# Patient Record
Sex: MALE | Race: BLACK OR AFRICAN AMERICAN | NOT HISPANIC OR LATINO | Employment: OTHER | URBAN - METROPOLITAN AREA
[De-identification: names, ages, dates, MRNs, and addresses within clinical notes are randomized per-mention and may not be internally consistent; named-entity substitution may affect disease eponyms.]

---

## 2017-06-14 ENCOUNTER — APPOINTMENT (OUTPATIENT)
Dept: PREADMISSION TESTING | Facility: HOSPITAL | Age: 67
End: 2017-06-14

## 2017-06-14 VITALS
DIASTOLIC BLOOD PRESSURE: 86 MMHG | RESPIRATION RATE: 18 BRPM | SYSTOLIC BLOOD PRESSURE: 135 MMHG | HEIGHT: 72 IN | WEIGHT: 187.19 LBS | HEART RATE: 85 BPM | OXYGEN SATURATION: 98 % | BODY MASS INDEX: 25.35 KG/M2 | TEMPERATURE: 98.2 F

## 2017-06-14 LAB
ANION GAP SERPL CALCULATED.3IONS-SCNC: 10.9 MMOL/L
BILIRUB UR QL STRIP: NEGATIVE
BUN BLD-MCNC: 15 MG/DL (ref 8–23)
BUN/CREAT SERPL: 12.5 (ref 7–25)
CALCIUM SPEC-SCNC: 9.2 MG/DL (ref 8.6–10.5)
CHLORIDE SERPL-SCNC: 100 MMOL/L (ref 98–107)
CLARITY UR: CLEAR
CO2 SERPL-SCNC: 30.1 MMOL/L (ref 22–29)
COLOR UR: YELLOW
CREAT BLD-MCNC: 1.2 MG/DL (ref 0.76–1.27)
DEPRECATED RDW RBC AUTO: 46.6 FL (ref 37–54)
ERYTHROCYTE [DISTWIDTH] IN BLOOD BY AUTOMATED COUNT: 14.7 % (ref 11.5–14.5)
GFR SERPL CREATININE-BSD FRML MDRD: 60 ML/MIN/1.73
GLUCOSE BLD-MCNC: 111 MG/DL (ref 65–99)
GLUCOSE UR STRIP-MCNC: NEGATIVE MG/DL
HCT VFR BLD AUTO: 42.7 % (ref 40.4–52.2)
HGB BLD-MCNC: 14.3 G/DL (ref 13.7–17.6)
HGB UR QL STRIP.AUTO: NEGATIVE
KETONES UR QL STRIP: NEGATIVE
LEUKOCYTE ESTERASE UR QL STRIP.AUTO: NEGATIVE
MCH RBC QN AUTO: 29.1 PG (ref 27–32.7)
MCHC RBC AUTO-ENTMCNC: 33.5 G/DL (ref 32.6–36.4)
MCV RBC AUTO: 86.8 FL (ref 79.8–96.2)
NITRITE UR QL STRIP: NEGATIVE
PH UR STRIP.AUTO: 7.5 [PH] (ref 5–8)
PLATELET # BLD AUTO: 267 10*3/MM3 (ref 140–500)
PMV BLD AUTO: 10.3 FL (ref 6–12)
POTASSIUM BLD-SCNC: 4.8 MMOL/L (ref 3.5–5.2)
PROT UR QL STRIP: ABNORMAL
RBC # BLD AUTO: 4.92 10*6/MM3 (ref 4.6–6)
SODIUM BLD-SCNC: 141 MMOL/L (ref 136–145)
SP GR UR STRIP: 1.02 (ref 1–1.03)
UROBILINOGEN UR QL STRIP: ABNORMAL
WBC NRBC COR # BLD: 4.92 10*3/MM3 (ref 4.5–10.7)

## 2017-06-14 PROCEDURE — 80048 BASIC METABOLIC PNL TOTAL CA: CPT | Performed by: UROLOGY

## 2017-06-14 PROCEDURE — 93005 ELECTROCARDIOGRAM TRACING: CPT

## 2017-06-14 PROCEDURE — 85027 COMPLETE CBC AUTOMATED: CPT | Performed by: UROLOGY

## 2017-06-14 PROCEDURE — 81003 URINALYSIS AUTO W/O SCOPE: CPT | Performed by: UROLOGY

## 2017-06-14 PROCEDURE — 36415 COLL VENOUS BLD VENIPUNCTURE: CPT

## 2017-06-14 PROCEDURE — 93010 ELECTROCARDIOGRAM REPORT: CPT | Performed by: INTERNAL MEDICINE

## 2017-06-14 RX ORDER — ASPIRIN 81 MG/1
81 TABLET ORAL DAILY
COMMUNITY

## 2017-06-14 NOTE — DISCHARGE INSTRUCTIONS
ARRIVE DAY OF SURGERY AT 10:30 AM        Take the following medications the morning of surgery with a small sip of water:    NONE    General Instructions:  • Do not eat solid food after midnight the night before surgery.  • You may drink clear liquids day of surgery but must stop at least one hour before your hospital arrival time.  • It is beneficial for you to have a clear drink that contains carbohydrates the day of surgery.  We suggest a 20 ounce bottle of Gatorade or Powerade for non-diabetic patients or a 20 ounce bottle of G2 or Powerade Zero for diabetic patients. (Pediatric patients, are not advised to drink a 20 ounce carbohydrate drink)    Clear liquids are liquids you can see through.  Nothing red in color.     Plain water                               Sports drinks  Sodas                                   Gelatin (Jell-O)  Fruit juices without pulp such as white grape juice and apple juice  Popsicles that contain no fruit or yogurt  Tea or coffee (no cream or milk added)  Gatorade / Powerade  G2 / Powerade Zero    • Infants may have breast milk up to four hours before surgery.  • Infants drinking formula may drink formula up to six hours before surgery.   • Patients who avoid smoking, chewing tobacco and alcohol for 4 weeks prior to surgery have a reduced risk of post-operative complications.  Quit smoking as many days before surgery as you can.  • Do not smoke, use chewing tobacco or drink alcohol the day of surgery.   • If applicable bring your C-PAP/ BI-PAP machine.  • Bring any papers given to you in the doctor’s office.  • Wear clean comfortable clothes and socks.  • Do not wear contact lenses or make-up.  Bring a case for your glasses.   • Bring crutches or walker if applicable.  • Leave all other valuables and jewelry at home.  • The Pre-Admission Testing nurse will instruct you to bring medications if unable to obtain an accurate list in Pre-Admission Testing.            Preventing a Surgical  Site Infection:  • For 2 to 3 days before surgery, avoid shaving with a razor because the razor can irritate skin and make it easier to develop an infection.  • The night prior to surgery sleep in a clean bed with clean clothing.  Do not allow pets to sleep with you.  • Shower on the morning of surgery using a fresh bar of anti-bacterial soap (such as Dial) and clean washcloth.  Dry with a clean towel and dress in clean clothing.  • Ask your surgeon if you will be receiving antibiotics prior to surgery.  • Make sure you, your family, and all healthcare providers clean their hands with soap and water or an alcohol based hand  before caring for you or your wound.    Day of surgery:  Upon arrival, a Pre-op nurse and Anesthesiologist will review your health history, obtain vital signs, and answer questions you may have.  The only belongings needed at this time will be your home medications and if applicable your C-PAP/BI-PAP machine.  If you are staying overnight your family can leave the rest of your belongings in the car and bring them to your room later.  A Pre-op nurse will start an IV and you may receive medication in preparation for surgery, including something to help you relax.  Your family will be able to see you in the Pre-op area.  While you are in surgery your family should notify the waiting room  if they leave the waiting room area and provide a contact phone number.    Please be aware that surgery does come with discomfort.  We want to make every effort to control your discomfort so please discuss any uncontrolled symptoms with your nurse.   Your doctor will most likely have prescribed pain medications.      If you are going home after surgery you will receive individualized written care instructions before being discharged.  A responsible adult must drive you to and from the hospital on the day of your surgery and stay with you for 24 hours.    If you are staying overnight following  surgery, you will be transported to your hospital room following the recovery period.  [facility] has all private rooms.    If you have any questions please call Pre-Admission Testing at [phone].  Deductibles and co-payments are collected on the day of service. Please be prepared to pay the required co-pay, deductible or deposit on the day of service as defined by your plan.

## 2017-06-28 ENCOUNTER — ANESTHESIA (OUTPATIENT)
Dept: PERIOP | Facility: HOSPITAL | Age: 67
End: 2017-06-28

## 2017-06-28 ENCOUNTER — ANESTHESIA EVENT (OUTPATIENT)
Dept: PERIOP | Facility: HOSPITAL | Age: 67
End: 2017-06-28

## 2017-06-28 ENCOUNTER — HOSPITAL ENCOUNTER (OUTPATIENT)
Facility: HOSPITAL | Age: 67
Setting detail: HOSPITAL OUTPATIENT SURGERY
Discharge: HOME | End: 2017-06-28
Attending: UROLOGY | Admitting: UROLOGY

## 2017-06-28 VITALS
TEMPERATURE: 97.7 F | DIASTOLIC BLOOD PRESSURE: 83 MMHG | RESPIRATION RATE: 18 BRPM | HEIGHT: 72 IN | SYSTOLIC BLOOD PRESSURE: 126 MMHG | WEIGHT: 189.6 LBS | BODY MASS INDEX: 25.68 KG/M2 | HEART RATE: 51 BPM | OXYGEN SATURATION: 100 %

## 2017-06-28 DIAGNOSIS — N39.3 STRESS INCONTINENCE, MALE: Primary | ICD-10-CM

## 2017-06-28 DIAGNOSIS — Z85.46 HISTORY OF PROSTATE CANCER: Chronic | ICD-10-CM

## 2017-06-28 PROCEDURE — 25010000002 ONDANSETRON PER 1 MG: Performed by: NURSE ANESTHETIST, CERTIFIED REGISTERED

## 2017-06-28 PROCEDURE — C1771 REP DEV, URINARY, W/SLING: HCPCS | Performed by: UROLOGY

## 2017-06-28 PROCEDURE — 25010000002 MIDAZOLAM PER 1 MG: Performed by: ANESTHESIOLOGY

## 2017-06-28 PROCEDURE — 25010000002 PROPOFOL 10 MG/ML EMULSION: Performed by: NURSE ANESTHETIST, CERTIFIED REGISTERED

## 2017-06-28 PROCEDURE — 25010000002 KETOROLAC TROMETHAMINE PER 15 MG: Performed by: NURSE ANESTHETIST, CERTIFIED REGISTERED

## 2017-06-28 PROCEDURE — 25010000002 FENTANYL CITRATE (PF) 100 MCG/2ML SOLUTION: Performed by: ANESTHESIOLOGY

## 2017-06-28 PROCEDURE — 25010000002 LEVOFLOXACIN PER 250 MG: Performed by: UROLOGY

## 2017-06-28 PROCEDURE — 25010000002 FENTANYL CITRATE (PF) 100 MCG/2ML SOLUTION: Performed by: NURSE ANESTHETIST, CERTIFIED REGISTERED

## 2017-06-28 DEVICE — SLNG M SYS ADV: Type: IMPLANTABLE DEVICE | Status: FUNCTIONAL

## 2017-06-28 RX ORDER — MIDAZOLAM HYDROCHLORIDE 1 MG/ML
1 INJECTION INTRAMUSCULAR; INTRAVENOUS
Status: DISCONTINUED | OUTPATIENT
Start: 2017-06-28 | End: 2017-06-28 | Stop reason: HOSPADM

## 2017-06-28 RX ORDER — DOCUSATE SODIUM 100 MG/1
100 CAPSULE, LIQUID FILLED ORAL 2 TIMES DAILY
Qty: 60 CAPSULE | Refills: 1 | Status: SHIPPED | OUTPATIENT
Start: 2017-06-28 | End: 2018-06-28

## 2017-06-28 RX ORDER — HYDRALAZINE HYDROCHLORIDE 20 MG/ML
5 INJECTION INTRAMUSCULAR; INTRAVENOUS
Status: DISCONTINUED | OUTPATIENT
Start: 2017-06-28 | End: 2017-06-28 | Stop reason: HOSPADM

## 2017-06-28 RX ORDER — OXYCODONE AND ACETAMINOPHEN 7.5; 325 MG/1; MG/1
TABLET ORAL
Status: COMPLETED
Start: 2017-06-28 | End: 2017-06-28

## 2017-06-28 RX ORDER — FENTANYL CITRATE 50 UG/ML
25 INJECTION, SOLUTION INTRAMUSCULAR; INTRAVENOUS
Status: DISCONTINUED | OUTPATIENT
Start: 2017-06-28 | End: 2017-06-28 | Stop reason: HOSPADM

## 2017-06-28 RX ORDER — DIPHENHYDRAMINE HYDROCHLORIDE 50 MG/ML
12.5 INJECTION INTRAMUSCULAR; INTRAVENOUS
Status: DISCONTINUED | OUTPATIENT
Start: 2017-06-28 | End: 2017-06-28 | Stop reason: HOSPADM

## 2017-06-28 RX ORDER — OXYCODONE AND ACETAMINOPHEN 7.5; 325 MG/1; MG/1
1 TABLET ORAL EVERY 4 HOURS PRN
Status: DISCONTINUED | OUTPATIENT
Start: 2017-06-28 | End: 2017-06-28 | Stop reason: HOSPADM

## 2017-06-28 RX ORDER — FENTANYL CITRATE 50 UG/ML
50 INJECTION, SOLUTION INTRAMUSCULAR; INTRAVENOUS
Status: DISCONTINUED | OUTPATIENT
Start: 2017-06-28 | End: 2017-06-28 | Stop reason: HOSPADM

## 2017-06-28 RX ORDER — SODIUM CHLORIDE, SODIUM LACTATE, POTASSIUM CHLORIDE, CALCIUM CHLORIDE 600; 310; 30; 20 MG/100ML; MG/100ML; MG/100ML; MG/100ML
9 INJECTION, SOLUTION INTRAVENOUS CONTINUOUS
Status: DISCONTINUED | OUTPATIENT
Start: 2017-06-28 | End: 2017-06-28 | Stop reason: HOSPADM

## 2017-06-28 RX ORDER — SODIUM CHLORIDE 0.9 % (FLUSH) 0.9 %
1-10 SYRINGE (ML) INJECTION AS NEEDED
Status: DISCONTINUED | OUTPATIENT
Start: 2017-06-28 | End: 2017-06-28 | Stop reason: HOSPADM

## 2017-06-28 RX ORDER — LABETALOL HYDROCHLORIDE 5 MG/ML
5 INJECTION, SOLUTION INTRAVENOUS
Status: DISCONTINUED | OUTPATIENT
Start: 2017-06-28 | End: 2017-06-28 | Stop reason: HOSPADM

## 2017-06-28 RX ORDER — MAGNESIUM HYDROXIDE 1200 MG/15ML
LIQUID ORAL AS NEEDED
Status: DISCONTINUED | OUTPATIENT
Start: 2017-06-28 | End: 2017-06-28 | Stop reason: HOSPADM

## 2017-06-28 RX ORDER — LIDOCAINE HYDROCHLORIDE 20 MG/ML
INJECTION, SOLUTION INFILTRATION; PERINEURAL AS NEEDED
Status: DISCONTINUED | OUTPATIENT
Start: 2017-06-28 | End: 2017-06-28 | Stop reason: SURG

## 2017-06-28 RX ORDER — BUPIVACAINE HYDROCHLORIDE 2.5 MG/ML
INJECTION, SOLUTION INFILTRATION; PERINEURAL AS NEEDED
Status: DISCONTINUED | OUTPATIENT
Start: 2017-06-28 | End: 2017-06-28 | Stop reason: HOSPADM

## 2017-06-28 RX ORDER — PROPOFOL 10 MG/ML
VIAL (ML) INTRAVENOUS AS NEEDED
Status: DISCONTINUED | OUTPATIENT
Start: 2017-06-28 | End: 2017-06-28 | Stop reason: SURG

## 2017-06-28 RX ORDER — SULFAMETHOXAZOLE AND TRIMETHOPRIM 800; 160 MG/1; MG/1
1 TABLET ORAL 2 TIMES DAILY
Qty: 20 TABLET | Refills: 0 | Status: SHIPPED | OUTPATIENT
Start: 2017-06-28 | End: 2017-07-08

## 2017-06-28 RX ORDER — FAMOTIDINE 10 MG/ML
20 INJECTION, SOLUTION INTRAVENOUS ONCE
Status: COMPLETED | OUTPATIENT
Start: 2017-06-28 | End: 2017-06-28

## 2017-06-28 RX ORDER — ONDANSETRON 2 MG/ML
INJECTION INTRAMUSCULAR; INTRAVENOUS AS NEEDED
Status: DISCONTINUED | OUTPATIENT
Start: 2017-06-28 | End: 2017-06-28 | Stop reason: SURG

## 2017-06-28 RX ORDER — ONDANSETRON 2 MG/ML
4 INJECTION INTRAMUSCULAR; INTRAVENOUS ONCE AS NEEDED
Status: DISCONTINUED | OUTPATIENT
Start: 2017-06-28 | End: 2017-06-28 | Stop reason: HOSPADM

## 2017-06-28 RX ORDER — KETOROLAC TROMETHAMINE 30 MG/ML
INJECTION, SOLUTION INTRAMUSCULAR; INTRAVENOUS AS NEEDED
Status: DISCONTINUED | OUTPATIENT
Start: 2017-06-28 | End: 2017-06-28 | Stop reason: SURG

## 2017-06-28 RX ORDER — LEVOFLOXACIN 5 MG/ML
500 INJECTION, SOLUTION INTRAVENOUS ONCE
Status: COMPLETED | OUTPATIENT
Start: 2017-06-28 | End: 2017-06-28

## 2017-06-28 RX ORDER — MIDAZOLAM HYDROCHLORIDE 1 MG/ML
2 INJECTION INTRAMUSCULAR; INTRAVENOUS
Status: DISCONTINUED | OUTPATIENT
Start: 2017-06-28 | End: 2017-06-28 | Stop reason: HOSPADM

## 2017-06-28 RX ORDER — OXYCODONE AND ACETAMINOPHEN 7.5; 325 MG/1; MG/1
1 TABLET ORAL EVERY 4 HOURS PRN
Qty: 40 TABLET | Refills: 0 | Status: SHIPPED | OUTPATIENT
Start: 2017-06-28 | End: 2018-06-28

## 2017-06-28 RX ORDER — ALBUTEROL SULFATE 2.5 MG/3ML
2.5 SOLUTION RESPIRATORY (INHALATION) ONCE AS NEEDED
Status: DISCONTINUED | OUTPATIENT
Start: 2017-06-28 | End: 2017-06-28 | Stop reason: HOSPADM

## 2017-06-28 RX ORDER — HYDROMORPHONE HYDROCHLORIDE 1 MG/ML
0.5 INJECTION, SOLUTION INTRAMUSCULAR; INTRAVENOUS; SUBCUTANEOUS
Status: DISCONTINUED | OUTPATIENT
Start: 2017-06-28 | End: 2017-06-28 | Stop reason: HOSPADM

## 2017-06-28 RX ADMIN — FENTANYL CITRATE 25 MCG: 50 INJECTION INTRAMUSCULAR; INTRAVENOUS at 14:04

## 2017-06-28 RX ADMIN — LEVOFLOXACIN 500 MG: 5 INJECTION, SOLUTION INTRAVENOUS at 13:18

## 2017-06-28 RX ADMIN — OXYCODONE HYDROCHLORIDE AND ACETAMINOPHEN 1 TABLET: 7.5; 325 TABLET ORAL at 16:25

## 2017-06-28 RX ADMIN — SODIUM CHLORIDE, POTASSIUM CHLORIDE, SODIUM LACTATE AND CALCIUM CHLORIDE 9 ML/HR: 600; 310; 30; 20 INJECTION, SOLUTION INTRAVENOUS at 15:53

## 2017-06-28 RX ADMIN — FENTANYL CITRATE 25 MCG: 50 INJECTION INTRAMUSCULAR; INTRAVENOUS at 15:45

## 2017-06-28 RX ADMIN — FENTANYL CITRATE 25 MCG: 50 INJECTION INTRAMUSCULAR; INTRAVENOUS at 14:32

## 2017-06-28 RX ADMIN — OXYCODONE AND ACETAMINOPHEN 1 TABLET: 7.5; 325 TABLET ORAL at 16:25

## 2017-06-28 RX ADMIN — FENTANYL CITRATE 25 MCG: 50 INJECTION INTRAMUSCULAR; INTRAVENOUS at 15:30

## 2017-06-28 RX ADMIN — PROPOFOL 250 MG: 10 INJECTION, EMULSION INTRAVENOUS at 13:38

## 2017-06-28 RX ADMIN — KETOROLAC TROMETHAMINE 30 MG: 30 INJECTION, SOLUTION INTRAMUSCULAR; INTRAVENOUS at 13:46

## 2017-06-28 RX ADMIN — SODIUM CHLORIDE, POTASSIUM CHLORIDE, SODIUM LACTATE AND CALCIUM CHLORIDE 9 ML/HR: 600; 310; 30; 20 INJECTION, SOLUTION INTRAVENOUS at 13:25

## 2017-06-28 RX ADMIN — FENTANYL CITRATE 25 MCG: 50 INJECTION INTRAMUSCULAR; INTRAVENOUS at 13:44

## 2017-06-28 RX ADMIN — FENTANYL CITRATE 25 MCG: 50 INJECTION INTRAMUSCULAR; INTRAVENOUS at 15:55

## 2017-06-28 RX ADMIN — MIDAZOLAM 2 MG: 1 INJECTION INTRAMUSCULAR; INTRAVENOUS at 13:23

## 2017-06-28 RX ADMIN — FAMOTIDINE 20 MG: 10 INJECTION INTRAVENOUS at 13:23

## 2017-06-28 RX ADMIN — LIDOCAINE HYDROCHLORIDE 40 MG: 20 INJECTION, SOLUTION INFILTRATION; PERINEURAL at 13:38

## 2017-06-28 RX ADMIN — FENTANYL CITRATE 25 MCG: 50 INJECTION INTRAMUSCULAR; INTRAVENOUS at 15:21

## 2017-06-28 RX ADMIN — ONDANSETRON 4 MG: 2 INJECTION INTRAMUSCULAR; INTRAVENOUS at 13:43

## 2017-06-28 NOTE — ANESTHESIA POSTPROCEDURE EVALUATION
Patient: Ion Matthew    Procedure Summary     Date Anesthesia Start Anesthesia Stop Room / Location    06/28/17 7039 [id number]  [facility] / [facility]       Procedure Diagnosis Surgeon Provider    MALE ADVANCE SLING, CYSTOSCOPY (N/A ) Stress incontinence, male MD Leo Mohan MD          Anesthesia Type: general  Last vitals  /81 (06/28/17 1658)    Temp 36.5 °C (97.7 °F) (06/28/17 1630)    Pulse 50 (06/28/17 1658)   Resp 18 (06/28/17 1658)    SpO2 100 % (06/28/17 1658)      Post Anesthesia Care and Evaluation    Patient location during evaluation: PACU  Patient participation: complete - patient participated  Level of consciousness: awake and alert  Pain management: adequate  Airway patency: patent  Anesthetic complications: No anesthetic complications    Cardiovascular status: acceptable  Respiratory status: acceptable  Hydration status: acceptable

## 2017-06-28 NOTE — ANESTHESIA PREPROCEDURE EVALUATION
Anesthesia Evaluation     Patient summary reviewed and Nursing notes reviewed   NPO Solid Status: > 8 hours       Airway   Mallampati: II  TM distance: >3 FB  Neck ROM: full  Dental - normal exam     Pulmonary - negative pulmonary ROS and normal exam    breath sounds clear to auscultation  Cardiovascular - normal exam    ECG reviewed  Rhythm: regular  Rate: normal    (+) hypertension,   (-) angina, orthopnea, PND, MOTT      Neuro/Psych- negative ROS  GI/Hepatic/Renal/Endo - negative ROS     Musculoskeletal (-) negative ROS    Abdominal    Substance History - negative use     OB/GYN negative ob/gyn ROS         Other      history of cancer (History of prostate cancer)                                    Anesthesia Plan    ASA 2     general     intravenous induction   Anesthetic plan and risks discussed with patient.

## 2017-06-28 NOTE — ANESTHESIA PROCEDURE NOTES
Airway  Urgency: elective    Airway not difficult    General Information and Staff    Patient location during procedure: OR  Anesthesiologist: NEVAEH CARMONA  CRNA: CHANO DE LA CRUZ    Indications and Patient Condition  Indications for airway management: airway protection    Preoxygenated: yes  Mask difficulty assessment: 1 - vent by mask    Final Airway Details  Final airway type: supraglottic airway      Successful airway: unique  Size 5    Number of attempts at approach: 1    Additional Comments  Smooth IV/mask induction and placement of LMA. Atraumatic, lips/teeth/mouth intact, as preop. +ETCO2, bilateral breath sounds and equal.

## 2017-07-12 NOTE — OP NOTE
URETHRAL SLING  Procedure Note    Ion Matthew  6/28/2017    Pre-op Diagnosis:   * No pre-op diagnosis entered *    Post-op Diagnosis:     Post-Op Diagnosis Codes:     * Stress incontinence, male [N39.3]    Procedure(s):  MALE ADVANCE SLING, CYSTOSCOPY    Surgeon(s):  Curt Hickman MD    Anesthesia: General    Staff:   Circulator: Lala Pederson RN; Shannon Spurling, RN  Scrub Person: Millie Peralta; Dameon Barney  Assistant: Emi Jay RN    Estimated Blood Loss: *No blood loss documented*    Specimens:                * No orders in the log *      Drains:   Urethral Catheter 06/28/17 1354 latex 16 (Active)   Daily Indications Selected surgeries ( tract, abdomen) 6/28/2017  4:43 PM   Securement secured to upper leg with adhesive device 6/28/2017  4:43 PM   Tolerance no signs/symptoms of discomfort 6/28/2017  4:43 PM   Urine Output (mL) 100 6/28/2017  5:20 PM           Findings: Stress incontinence    Complications: None    Indications: 67-year-old gentleman history of prostate cancer post-prostatectomy.  He has persistent incontinence.  His incontinence is mildly bothersome.  He'll undergo male urethral sling    Procedure: Patient was taken out for suite given general endotracheal anesthesia after informed consent was obtained.  He was placed in a lithotomy position.  His knees were leveled with the femurs at 90° and the knees about shoulder width apart.  He was prepped and draped in sterile fashion.  A 14 Dutch Pisano catheter was placed.  The midline perineal incision was made and we cut down onto the bulbar urethra.  The bulbospongiosus muscle was incised in the midline.  North Star retractor was utilized.  The central tendon was taken down in the bulbar urethra was marked at its insertion.  I made 2 stab incisions in the groin just below the abductor longus tendon on each side.  Our AMS sling was soaked in bacitracin solution and the tension sutures were tacked down with 2-0 Vicryl  using the  passers we then went from the groin incision underneath the pubic symphysis through the  canal [place] coming up underneath the symphysis at the angle of the urethra and the arch of the pubic bone.  One arm of the sling was pulled through on the left and in a similar fashion the right side was pulled through.  The most proximal portion of the butterfly sling was then tacked down to the bulbar urethra at the level of our central tendon and then it was stretched over the bulbar urethra tacked down with another Vicryl stitch.  I removed the catheter and cystoscopy was performed.  I pulled back through the sphincteric urethra pulled apart sling excellent coaptation was shown as we elevated the sphincteric complex.  I dropped down the knees slightly and pulled up the sling further.  The  passers were cut off this arms of the sling were pulled out and the sling was tension one more time.  I irrigated all the incisions bacitracin solution and then I closed the Cresencio's muscle with 2-0 Vicryl and the perineum was closed in multiple layers of 2-0 Vicryl and 4-0 Vicryl subcuticular.  14 Greenlandic Pisano catheter was placed.  Arms of the sling were trimmed off and the 2 groin incisions was closed with 4-0 Vicryl.  Skin glue was placed all the wounds.  He was woken  Taken to recovery in stable condition.      Curt Hickman MD     Date: 7/12/2017  Time: 11:40 AM

## 2018-11-16 ENCOUNTER — CONVERSION ENCOUNTER (OUTPATIENT)
Dept: GENERAL RADIOLOGY | Facility: HOSPITAL | Age: 68
End: 2018-11-16

## 2019-05-17 ENCOUNTER — HOSPITAL ENCOUNTER (OUTPATIENT)
Dept: GENERAL RADIOLOGY | Facility: HOSPITAL | Age: 69
Discharge: HOME | End: 2019-05-17
Attending: FAMILY MEDICINE

## 2019-05-31 ENCOUNTER — HOSPITAL ENCOUNTER (OUTPATIENT)
Dept: GENERAL RADIOLOGY | Facility: HOSPITAL | Age: 69
Discharge: HOME | End: 2019-05-31
Attending: FAMILY MEDICINE

## 2019-06-21 ENCOUNTER — HOSPITAL ENCOUNTER (OUTPATIENT)
Dept: GENERAL RADIOLOGY | Facility: HOSPITAL | Age: 69
Discharge: HOME | End: 2019-06-21
Attending: FAMILY MEDICINE

## 2020-05-08 ENCOUNTER — HOSPITAL ENCOUNTER (OUTPATIENT)
Dept: GENERAL RADIOLOGY | Facility: HOSPITAL | Age: 70
Discharge: HOME | End: 2020-05-08
Attending: FAMILY MEDICINE

## 2021-01-22 ENCOUNTER — HOSPITAL ENCOUNTER (OUTPATIENT)
Dept: NUCLEAR MEDICINE | Facility: HOSPITAL | Age: 71
Discharge: HOME | End: 2021-01-22
Attending: FAMILY MEDICINE

## 2021-05-21 ENCOUNTER — HOSPITAL ENCOUNTER (OUTPATIENT)
Dept: URGENT CARE | Facility: CLINIC | Age: 71
Discharge: HOME | End: 2021-05-21
Attending: EMERGENCY MEDICINE

## 2021-09-01 ENCOUNTER — OFFICE VISIT (OUTPATIENT)
Dept: CARDIOLOGY | Facility: CLINIC | Age: 71
End: 2021-09-01

## 2021-09-01 VITALS
HEART RATE: 54 BPM | DIASTOLIC BLOOD PRESSURE: 72 MMHG | SYSTOLIC BLOOD PRESSURE: 137 MMHG | WEIGHT: 198 LBS | HEIGHT: 72 IN | BODY MASS INDEX: 26.82 KG/M2

## 2021-09-01 DIAGNOSIS — I10 ESSENTIAL HYPERTENSION: ICD-10-CM

## 2021-09-01 DIAGNOSIS — R94.31 ABNORMAL EKG: ICD-10-CM

## 2021-09-01 DIAGNOSIS — R07.2 PRECORDIAL PAIN: Primary | ICD-10-CM

## 2021-09-01 PROCEDURE — 93000 ELECTROCARDIOGRAM COMPLETE: CPT | Performed by: INTERNAL MEDICINE

## 2021-09-01 PROCEDURE — 99204 OFFICE O/P NEW MOD 45 MIN: CPT | Performed by: INTERNAL MEDICINE

## 2021-09-01 RX ORDER — DICLOFENAC SODIUM 75 MG/1
75 TABLET, DELAYED RELEASE ORAL 2 TIMES DAILY
COMMUNITY
Start: 2021-07-23 | End: 2022-06-09

## 2021-09-01 RX ORDER — ATENOLOL AND CHLORTHALIDONE TABLET 50; 25 MG/1; MG/1
1 TABLET ORAL DAILY
COMMUNITY
Start: 2021-06-09

## 2021-09-01 NOTE — PROGRESS NOTES
Chief Complaint  Chest Pain    Subjective            Ion Matthew presents to [facility] CARDIOLOGY  History of Present Illness    Mr. Matthew is a 71-year-old male he has no previous cardiac history.  He is treated for hypertension.  He has had intermittent chest pain over the years and has had a couple of stress test procedures in the past but nothing since 2005.  He has focal left-sided mid pectoral chest pain, mild to moderately intense intermittent sometimes lasting 20 to 30 minutes at a time.  It is not specifically exertional.  No associated shortness of breath or dizziness.    PMH  Past Medical History:   Diagnosis Date   • Bladder leak    • Cancer (CMS/[facility]) 2006    Prostate   • History of prostate cancer    • Pinched nerve in neck    • Wrist pain          SURGICALHX  Past Surgical History:   Procedure Laterality Date   • APPENDECTOMY     • HERNIA REPAIR     • PROSTATECTOMY     • URETHRAL SLING N/A 6/28/2017    Procedure: MALE ADVANCE SLING, CYSTOSCOPY;  Surgeon: Curt Hickman MD;  Location: [facility];  Service:         SOC  Social History     Socioeconomic History   • Marital status:      Spouse name: Not on file   • Number of children: Not on file   • Years of education: Not on file   • Highest education level: Not on file   Tobacco Use   • Smoking status: Never Smoker   • Smokeless tobacco: Never Used   Vaping Use   • Vaping Use: Never used   Substance and Sexual Activity   • Alcohol use: No   • Drug use: No   • Sexual activity: Defer         FAMHX  Family History   Problem Relation Age of Onset   • Hypertension Other    • Stroke Mother    • Stroke Sister    • Heart failure Maternal Aunt           ALLERGY  Allergies   Allergen Reactions   • Penicillins Other (See Comments)     UNKNOWN        MEDSCURRENT    Current Outpatient Medications:   •  aspirin 81 MG EC tablet, Take 81 mg by mouth Daily., Disp: , Rfl:   •  atenolol-chlorthalidone (TENORETIC) 50-25 MG per  "tablet, Take 1 tablet by mouth Daily., Disp: , Rfl:   •  diclofenac (VOLTAREN) 75 MG EC tablet, Take 75 mg by mouth 2 (Two) Times a Day., Disp: , Rfl:   •  lisinopril 10 MG tablet 20 mg, hydrochlorothiazide 25 MG tablet 25 mg, Take 1 dose by mouth Every Morning., Disp: , Rfl:       Review of Systems   Constitutional: Negative.   HENT: Negative.    Eyes: Negative.    Cardiovascular: Positive for chest pain.   Respiratory: Negative.    Endocrine: Negative.    Hematologic/Lymphatic: Negative.    Skin: Negative.    Musculoskeletal: Negative.    Gastrointestinal: Negative.    Genitourinary: Negative.    Neurological: Negative.    Psychiatric/Behavioral: Negative.         Objective     /72   Pulse 54   Ht 182.9 cm (72\")   Wt 89.8 kg (198 lb)   BMI 26.85 kg/m²       General Appearance:   · well developed  · well nourished  HENT:   · oropharynx moist  · lips not cyanotic  Neck:  · thyroid not enlarged  · supple  Respiratory:  · no respiratory distress  · normal breath sounds  · no rales  Cardiovascular:  · no jugular venous distention  · regular rhythm  · apical impulse normal  · S1 normal, S2 normal  · no S3, no S4   · no murmur  · no rub, no thrill  · carotid pulses normal; no bruit  · pedal pulses normal  · lower extremity edema: none    Musculoskeletal:  · no clubbing of fingers.   · normocephalic, head atraumatic  Skin:   · warm, dry  Psychiatric:  · judgement and insight appropriate  · normal mood and affect      Result Review :     The following data was reviewed by: Meet Chaudhry MD on 09/01/2021:        Data reviewed: Primary care records reviewed, laboratory studies reviewed recently, , total cholesterol 223, triglyceride 257, chest x-ray reviewed from May 2020 normal cardiac silhouette no active disease      ECG 12 Lead    Date/Time: 9/1/2021 12:28 PM  Performed by: EDDIE Chaudhry MD  Authorized by: EDDIE Chaudhry MD   Rhythm: sinus rhythm  Rate: normal  Conduction: incomplete " left bundle branch block  ST Segments: ST segments normal  T Waves: T waves normal  QRS axis: normal  Other: no other findings    Clinical impression: abnormal EKG                     Assessment and Plan        ASSESSMENT:  Encounter Diagnoses   Name Primary?   • Precordial pain Yes   • Essential hypertension    • Abnormal EKG          PLAN:    1.  Ion has chest discomfort intermittently.  Not entirely typical for angina.  Risk factor includes elevated cholesterol and hypertension.  His baseline EKG is nondiagnostic due to an interventricular conduction delay.  I recommend stress imaging because treadmill stress EKG is not going to be diagnostic with this baseline EKG.  The patient is agreeable with this plan  2.  Continue current blood pressure management  3.  We will discuss results when available otherwise he will be followed as needed if stress testing is low risk          Patient was given instructions and counseling regarding his condition or for health maintenance advice. Please see specific information pulled into the AVS if appropriate.             EDDIE Chaudhry MD  9/1/2021    12:27 EDT

## 2021-09-24 ENCOUNTER — HOSPITAL ENCOUNTER (OUTPATIENT)
Dept: NUCLEAR MEDICINE | Facility: HOSPITAL | Age: 71
Discharge: HOME | End: 2021-09-24

## 2021-09-24 DIAGNOSIS — R07.2 PRECORDIAL PAIN: ICD-10-CM

## 2021-09-24 LAB
BH CV IMMEDIATE POST RECOVERY TECH DATA SYMPTOMS: NORMAL
BH CV IMMEDIATE POST TECH DATA BLOOD PRESSURE: NORMAL MMHG
BH CV IMMEDIATE POST TECH DATA HEART RATE: 85 BPM
BH CV IMMEDIATE POST TECH DATA OXYGEN SATS: 98 %
BH CV REST NUCLEAR ISOTOPE DOSE: 9.9 MCI
BH CV SIX MINUTE RECOVERY TECH DATA BLOOD PRESSURE: NORMAL
BH CV SIX MINUTE RECOVERY TECH DATA HEART RATE: 79 BPM
BH CV SIX MINUTE RECOVERY TECH DATA OXYGEN SATURATION: 98 %
BH CV SIX MINUTE RECOVERY TECH DATA SYMPTOMS: NORMAL
BH CV STRESS BP STAGE 1: NORMAL
BH CV STRESS COMMENTS STAGE 1: NORMAL
BH CV STRESS DOSE REGADENOSON STAGE 1: 0.4
BH CV STRESS DURATION MIN STAGE 1: 0
BH CV STRESS DURATION SEC STAGE 1: 10
BH CV STRESS HR STAGE 1: 67
BH CV STRESS NUCLEAR ISOTOPE DOSE: 36.4 MCI
BH CV STRESS O2 STAGE 1: 97
BH CV STRESS PROTOCOL 1: NORMAL
BH CV STRESS RECOVERY BP: NORMAL MMHG
BH CV STRESS RECOVERY HR: 79 BPM
BH CV STRESS RECOVERY O2: 98 %
BH CV STRESS STAGE 1: 1
BH CV THREE MINUTE POST TECH DATA BLOOD PRESSURE: NORMAL MMHG
BH CV THREE MINUTE POST TECH DATA HEART RATE: 79 BPM
BH CV THREE MINUTE POST TECH DATA OXYGEN SATURATION: 98 %
LV EF NUC BP: 56 %
MAXIMAL PREDICTED HEART RATE: 149 BPM
PERCENT MAX PREDICTED HR: 57.05 %
STRESS BASELINE BP: NORMAL MMHG
STRESS BASELINE HR: 62 BPM
STRESS O2 SAT REST: 97 %
STRESS PERCENT HR: 67 %
STRESS POST O2 SAT PEAK: 98 %
STRESS POST PEAK BP: NORMAL MMHG
STRESS POST PEAK HR: 85 BPM
STRESS TARGET HR: 127 BPM

## 2021-09-24 PROCEDURE — 93018 CV STRESS TEST I&R ONLY: CPT | Performed by: INTERNAL MEDICINE

## 2021-09-24 PROCEDURE — 93016 CV STRESS TEST SUPVJ ONLY: CPT | Performed by: NURSE PRACTITIONER

## 2021-09-24 PROCEDURE — 0 TECHNETIUM TETROFOSMIN KIT: Performed by: INTERNAL MEDICINE

## 2021-09-24 PROCEDURE — 78452 HT MUSCLE IMAGE SPECT MULT: CPT

## 2021-09-24 PROCEDURE — 93017 CV STRESS TEST TRACING ONLY: CPT

## 2021-09-24 PROCEDURE — A9502 TC99M TETROFOSMIN: HCPCS | Performed by: INTERNAL MEDICINE

## 2021-09-24 PROCEDURE — 25010000002 REGADENOSON 0.4 MG/5ML SOLUTION

## 2021-09-24 PROCEDURE — 78452 HT MUSCLE IMAGE SPECT MULT: CPT | Performed by: INTERNAL MEDICINE

## 2021-09-24 RX ADMIN — REGADENOSON 0.4 MG: 0.08 INJECTION, SOLUTION INTRAVENOUS at 12:10

## 2021-09-24 RX ADMIN — TETROFOSMIN 1 DOSE: 1.38 INJECTION, POWDER, LYOPHILIZED, FOR SOLUTION INTRAVENOUS at 09:56

## 2021-09-24 RX ADMIN — TETROFOSMIN 1 DOSE: 1.38 INJECTION, POWDER, LYOPHILIZED, FOR SOLUTION INTRAVENOUS at 12:10

## 2021-12-01 ENCOUNTER — OFFICE VISIT (OUTPATIENT)
Dept: SURGERY | Facility: CLINIC | Age: 71
End: 2021-12-01

## 2021-12-01 VITALS — WEIGHT: 198.8 LBS | BODY MASS INDEX: 26.93 KG/M2 | HEIGHT: 72 IN

## 2021-12-01 DIAGNOSIS — M62.08 RECTUS DIASTASIS: Primary | ICD-10-CM

## 2021-12-01 PROCEDURE — 99203 OFFICE O/P NEW LOW 30 MIN: CPT | Performed by: SURGERY

## 2021-12-01 RX ORDER — NAPROXEN 500 MG/1
TABLET ORAL
COMMUNITY
Start: 2021-11-19 | End: 2022-03-31 | Stop reason: ALTCHOICE

## 2021-12-01 RX ORDER — CYCLOBENZAPRINE HCL 10 MG
10 TABLET ORAL DAILY
COMMUNITY
End: 2022-06-09

## 2021-12-01 RX ORDER — TESTOSTERONE 16.2 MG/G
GEL TRANSDERMAL
COMMUNITY
Start: 2021-11-24

## 2021-12-01 RX ORDER — SILDENAFIL 100 MG/1
100 TABLET, FILM COATED ORAL DAILY PRN
COMMUNITY
Start: 2021-09-25 | End: 2022-06-09

## 2021-12-01 NOTE — PROGRESS NOTES
Chief Complaint: Hernia (Ventral)    Subjective         History of Present Illness  Ion Matthew is a 71 y.o. male presents to [facility] GENERAL SURGERY to be seen for bulging in his upper abdomen.  Patient reports for at least several months he has had some bulging in his upper abdomen.  The bulging is increased when he does a sit up or abdominal straining.  He reports minimal symptoms with this no pain no nausea no vomiting.  He had a history of umbilical hernia repair in the past and he was concerned that this was a hernia.  Reports no obstructive type symptoms..    Objective     Past Medical History:   Diagnosis Date   • Bladder leak    • Cancer (HCC) 2006    Prostate   • History of prostate cancer    • Pinched nerve in neck    • Wrist pain        Past Surgical History:   Procedure Laterality Date   • APPENDECTOMY     • HERNIA REPAIR     • HERNIA REPAIR     • PROSTATECTOMY     • URETHRAL SLING N/A 6/28/2017    Procedure: MALE ADVANCE SLING, CYSTOSCOPY;  Surgeon: Curt Hickman MD;  Location: [facility];  Service:          Current Outpatient Medications:   •  aspirin 81 MG EC tablet, Take 81 mg by mouth Daily., Disp: , Rfl:   •  atenolol-chlorthalidone (TENORETIC) 50-25 MG per tablet, Take 1 tablet by mouth Daily., Disp: , Rfl:   •  cyclobenzaprine (FLEXERIL) 10 MG tablet, Take 10 mg by mouth Daily., Disp: , Rfl:   •  diclofenac (VOLTAREN) 75 MG EC tablet, Take 75 mg by mouth 2 (Two) Times a Day., Disp: , Rfl:   •  naproxen (NAPROSYN) 500 MG tablet, TAKE 1 TABLET BY MOUTH TWICE DAILY FOR 14 DAYS, Disp: , Rfl:   •  sildenafil (VIAGRA) 100 MG tablet, Take 100 mg by mouth Daily As Needed., Disp: , Rfl:   •  Testosterone 20.25 MG/ACT (1.62%) gel, APPLY 3 PUMPS TO THE AFFECTED AREA EVERY DAY, Disp: , Rfl:   •  lisinopril 10 MG tablet 20 mg, hydrochlorothiazide 25 MG tablet 25 mg, Take 1 dose by mouth Every Morning., Disp: , Rfl:     Allergies   Allergen Reactions   • Penicillins Other  "(See Comments)     UNKNOWN        Family History   Problem Relation Age of Onset   • Hypertension Other    • Stroke Mother    • Diabetes Mother    • Stroke Sister    • Heart failure Maternal Aunt    • Diabetes Father        Social History     Socioeconomic History   • Marital status:    Tobacco Use   • Smoking status: Never Smoker   • Smokeless tobacco: Never Used   Vaping Use   • Vaping Use: Never used   Substance and Sexual Activity   • Alcohol use: No   • Drug use: No   • Sexual activity: Defer       Vital Signs:   Ht 182.9 cm (72\")   Wt 90.2 kg (198 lb 12.8 oz)   BMI 26.96 kg/m²      Physical Exam  Constitutional:       Appearance: Normal appearance.   Cardiovascular:      Rate and Rhythm: Normal rate.   Pulmonary:      Effort: Pulmonary effort is normal.   Abdominal:      Palpations: Abdomen is soft.   Skin:     General: Skin is warm.     Patient has a rectus diastases.  I do not feel any evidence of a ventral hernia.    Result Review :            Procedures        Assessment and Plan    There are no diagnoses linked to this encounter.    Follow Up   No follow-ups on file.  Patient was given instructions and counseling regarding his condition or for health maintenance advice. Please see specific information pulled into the AVS if appropriate.     Patient has a clear rectus diastases.  I do not feel any evidence of a hernia.  I do not feel any evidence of hernia around his umbilicus either, it does not appear he has any evidence of recurrence.  I discussed with him the findings of rectus diastases.  I discussed him what this was.  I have recommended no intervention on his rectus diastases especially since he has minimal symptoms.    At this point time he can follow-up with me as needed and call with any questions or concerns.    Discussed with the patient - all questions were answered they voiced understanding and agreed to proceed with above plan    "

## 2022-03-31 ENCOUNTER — PREP FOR SURGERY (OUTPATIENT)
Dept: OTHER | Facility: HOSPITAL | Age: 72
End: 2022-03-31

## 2022-03-31 ENCOUNTER — OFFICE VISIT (OUTPATIENT)
Dept: GASTROENTEROLOGY | Facility: CLINIC | Age: 72
End: 2022-03-31

## 2022-03-31 VITALS
HEIGHT: 72 IN | SYSTOLIC BLOOD PRESSURE: 149 MMHG | HEART RATE: 100 BPM | DIASTOLIC BLOOD PRESSURE: 83 MMHG | TEMPERATURE: 98.7 F | BODY MASS INDEX: 28.31 KG/M2 | WEIGHT: 209 LBS

## 2022-03-31 DIAGNOSIS — R68.81 EARLY SATIETY: ICD-10-CM

## 2022-03-31 DIAGNOSIS — Z12.11 ENCOUNTER FOR SCREENING FOR MALIGNANT NEOPLASM OF COLON: ICD-10-CM

## 2022-03-31 DIAGNOSIS — Z86.010 PERSONAL HISTORY OF COLONIC POLYPS: ICD-10-CM

## 2022-03-31 DIAGNOSIS — R14.0 ABDOMINAL BLOATING: Primary | ICD-10-CM

## 2022-03-31 PROBLEM — Z86.0100 PERSONAL HISTORY OF COLONIC POLYPS: Status: ACTIVE | Noted: 2022-03-31

## 2022-03-31 PROCEDURE — 99204 OFFICE O/P NEW MOD 45 MIN: CPT | Performed by: NURSE PRACTITIONER

## 2022-03-31 RX ORDER — POLYETHYLENE GLYCOL 3350, SODIUM SULFATE ANHYDROUS, SODIUM BICARBONATE, SODIUM CHLORIDE, POTASSIUM CHLORIDE 227.1; 21.5; 6.36; 5.53; .754 G/L; G/L; G/L; G/L; G/L
4 POWDER, FOR SOLUTION ORAL DAILY
Qty: 1 EACH | Refills: 0 | Status: SHIPPED | OUTPATIENT
Start: 2022-03-31 | End: 2022-04-01

## 2022-03-31 NOTE — PROGRESS NOTES
Patient Name: Ion Matthew   Visit Date: 03/31/2022   Patient ID: [id number]  Provider: SIMONE Dozier    Sex: male  Location:  Location Address:  Location Phone: [phone]  [place] [address] [phone]    YOB: 1950  Age: 71 y.o.   Primary Care Provider Carlos Bolaños MD      Referring Provider: Carlos Bolaños MD        Chief Complaint  Bloated (Every time he eats, he gets bloated and it is uncomfortable)    History of Present Illness    New pt presents w c/o abd bloating after a small meals or a few bites x 1 yr. Sx worse if ate late at night. He has reduced this and is somewhat better. No HB.  Pt states he does alternate w constipation and diarrhea. Doesn't think he drinks enough water, constipation improved with this. No blood in stool.    Reporting he has always been very healthy w exercising but during 2020 he overate d/t quarantine/COVID, so he gained weight, about 15#.  He has lost about 7# intentionally since then.   Last colonoscopy 2015, thinks normal, not sure of recall. Was done through . He would like to have repeated. Pt thinks he has had polyps in past.     Has had COVID vaccine  Past Medical History:   Diagnosis Date   • Bladder leak    • Cancer (HCC) 2006    Prostate   • History of prostate cancer    • Pinched nerve in neck    • Wrist pain        Past Surgical History:   Procedure Laterality Date   • APPENDECTOMY     • COLONOSCOPY     • HERNIA REPAIR     • HERNIA REPAIR     • PROSTATECTOMY     • URETHRAL SLING N/A 06/28/2017    Procedure: MALE ADVANCE SLING, CYSTOSCOPY;  Surgeon: Curt Hickman MD;  Location: [facility] OR;  Service:        Allergies   Allergen Reactions   • Penicillins Other (See Comments)     UNKNOWN       Family History   Problem Relation Age of Onset   • Hypertension Other    • Stroke Mother    • Diabetes Mother    • Stroke Sister    • Heart failure Maternal Aunt    • Diabetes Father         Social History     Tobacco Use   •  "Smoking status: Never Smoker   • Smokeless tobacco: Never Used   Vaping Use   • Vaping Use: Never used   Substance Use Topics   • Alcohol use: No   • Drug use: No       Objective     Vital Signs:   /83 (BP Location: Left arm, Patient Position: Sitting, Cuff Size: Large Adult)   Pulse 100   Temp 98.7 °F (37.1 °C) (Oral)   Ht 182.9 cm (72\")   Wt 94.8 kg (209 lb)   BMI 28.35 kg/m²       Physical Exam  Constitutional:       General: The patient is not in acute distress.     Appearance: Normal appearance.   HENT:      Head: Normocephalic and atraumatic.      Nose: Nose normal.   Pulmonary:      Effort: Pulmonary effort is normal. No respiratory distress.   Abdominal:      General: Abdomen is flat.      Palpations: Abdomen is soft. There is no mass.      Tenderness: There is no abdominal tenderness. There is no guarding.   Musculoskeletal:      Cervical back: Neck supple.      Right lower leg: No edema.      Left lower leg: No edema.   Skin:     General: Skin is warm and dry.   Neurological:      General: No focal deficit present.      Mental Status: The patient is alert and oriented to person, place, and time.      Gait: Gait normal.   Psychiatric:         Mood and Affect: Mood normal.         Speech: Speech normal.         Behavior: Behavior normal.         Thought Content: Thought content normal.     Result Review :   The following data was reviewed by: SIMONE Dozier on 03/31/2022:                         Assessment and Plan    Diagnoses and all orders for this visit:    1. Abdominal bloating (Primary)    2. Early satiety    3. Encounter for screening for malignant neoplasm of colon    Other orders  -     PEG 3350-KCl-NaBcb-NaCl-NaSulf (Golytely) 227.1 g pack; Take 4 L by mouth Daily for 1 day. Take per office instructions  Dispense: 1 each; Refill: 0            Follow Up      Hold NSAIDs  EGD/COLONOSCOPY Surgical Risk and Benefits: Possible risks/complications, benefits, and alternatives to " surgical or invasive procedure have been explained to patient and/or legal guardian; risks include bleeding, infection, and perforation. Patient has been evaluated and can tolerate anesthesia and/or sedation. Risks, benefits, and alternatives to anesthesia and sedation have been explained to patient and/or legal guardian.     Patient was given instructions and counseling regarding his condition or for health maintenance advice. Please see specific information pulled into the AVS if appropriate.

## 2022-06-09 NOTE — PRE-PROCEDURE INSTRUCTIONS
Pt. Instructed on laxative and skin prep, pre-op meds, clear liquid diet. Ok to take Atenolol-Chlorthalidone a.m. of procedure.

## 2022-06-10 ENCOUNTER — ANESTHESIA (OUTPATIENT)
Dept: GASTROENTEROLOGY | Facility: HOSPITAL | Age: 72
End: 2022-06-10

## 2022-06-10 ENCOUNTER — ANESTHESIA EVENT (OUTPATIENT)
Dept: GASTROENTEROLOGY | Facility: HOSPITAL | Age: 72
End: 2022-06-10

## 2022-06-10 ENCOUNTER — HOSPITAL ENCOUNTER (OUTPATIENT)
Facility: HOSPITAL | Age: 72
Setting detail: HOSPITAL OUTPATIENT SURGERY
Discharge: HOME | End: 2022-06-10
Attending: INTERNAL MEDICINE | Admitting: INTERNAL MEDICINE

## 2022-06-10 VITALS
DIASTOLIC BLOOD PRESSURE: 65 MMHG | WEIGHT: 206.35 LBS | HEIGHT: 72 IN | OXYGEN SATURATION: 99 % | SYSTOLIC BLOOD PRESSURE: 105 MMHG | RESPIRATION RATE: 20 BRPM | BODY MASS INDEX: 27.95 KG/M2 | TEMPERATURE: 97.4 F | HEART RATE: 67 BPM

## 2022-06-10 DIAGNOSIS — Z12.11 ENCOUNTER FOR SCREENING FOR MALIGNANT NEOPLASM OF COLON: ICD-10-CM

## 2022-06-10 DIAGNOSIS — R68.81 EARLY SATIETY: ICD-10-CM

## 2022-06-10 DIAGNOSIS — R14.0 ABDOMINAL BLOATING: ICD-10-CM

## 2022-06-10 DIAGNOSIS — Z86.010 PERSONAL HISTORY OF COLONIC POLYPS: ICD-10-CM

## 2022-06-10 PROCEDURE — 88305 TISSUE EXAM BY PATHOLOGIST: CPT | Performed by: INTERNAL MEDICINE

## 2022-06-10 PROCEDURE — 25010000002 PROPOFOL 10 MG/ML EMULSION: Performed by: NURSE ANESTHETIST, CERTIFIED REGISTERED

## 2022-06-10 PROCEDURE — G0105 COLORECTAL SCRN; HI RISK IND: HCPCS | Performed by: INTERNAL MEDICINE

## 2022-06-10 PROCEDURE — 43239 EGD BIOPSY SINGLE/MULTIPLE: CPT | Performed by: INTERNAL MEDICINE

## 2022-06-10 PROCEDURE — 88342 IMHCHEM/IMCYTCHM 1ST ANTB: CPT | Performed by: INTERNAL MEDICINE

## 2022-06-10 RX ORDER — LIDOCAINE HYDROCHLORIDE 20 MG/ML
INJECTION, SOLUTION EPIDURAL; INFILTRATION; INTRACAUDAL; PERINEURAL AS NEEDED
Status: DISCONTINUED | OUTPATIENT
Start: 2022-06-10 | End: 2022-06-10 | Stop reason: SURG

## 2022-06-10 RX ORDER — SODIUM CHLORIDE, SODIUM LACTATE, POTASSIUM CHLORIDE, CALCIUM CHLORIDE 600; 310; 30; 20 MG/100ML; MG/100ML; MG/100ML; MG/100ML
30 INJECTION, SOLUTION INTRAVENOUS CONTINUOUS
Status: DISCONTINUED | OUTPATIENT
Start: 2022-06-10 | End: 2022-06-10 | Stop reason: HOSPADM

## 2022-06-10 RX ORDER — PROPOFOL 10 MG/ML
VIAL (ML) INTRAVENOUS AS NEEDED
Status: DISCONTINUED | OUTPATIENT
Start: 2022-06-10 | End: 2022-06-10 | Stop reason: SURG

## 2022-06-10 RX ORDER — PANTOPRAZOLE SODIUM 40 MG/1
40 TABLET, DELAYED RELEASE ORAL DAILY
Qty: 30 TABLET | Refills: 5 | Status: SHIPPED | OUTPATIENT
Start: 2022-06-10

## 2022-06-10 RX ORDER — EPHEDRINE SULFATE 50 MG/ML
INJECTION, SOLUTION INTRAVENOUS AS NEEDED
Status: DISCONTINUED | OUTPATIENT
Start: 2022-06-10 | End: 2022-06-10 | Stop reason: SURG

## 2022-06-10 RX ADMIN — SODIUM CHLORIDE, POTASSIUM CHLORIDE, SODIUM LACTATE AND CALCIUM CHLORIDE 30 ML/HR: 600; 310; 30; 20 INJECTION, SOLUTION INTRAVENOUS at 10:26

## 2022-06-10 RX ADMIN — EPHEDRINE SULFATE 10 MG: 50 INJECTION INTRAVENOUS at 11:36

## 2022-06-10 RX ADMIN — SODIUM CHLORIDE, POTASSIUM CHLORIDE, SODIUM LACTATE AND CALCIUM CHLORIDE: 600; 310; 30; 20 INJECTION, SOLUTION INTRAVENOUS at 11:36

## 2022-06-10 RX ADMIN — PROPOFOL 120 MG: 10 INJECTION, EMULSION INTRAVENOUS at 11:00

## 2022-06-10 RX ADMIN — PROPOFOL 150 MCG/KG/MIN: 10 INJECTION, EMULSION INTRAVENOUS at 11:00

## 2022-06-10 RX ADMIN — LIDOCAINE HYDROCHLORIDE 80 MG: 20 INJECTION, SOLUTION EPIDURAL; INFILTRATION; INTRACAUDAL; PERINEURAL at 11:00

## 2022-06-10 NOTE — ANESTHESIA PREPROCEDURE EVALUATION
Anesthesia Evaluation     Patient summary reviewed and Nursing notes reviewed   NPO Solid Status: > 6 hours  NPO Liquid Status: > 6 hours           Airway   Mallampati: II  TM distance: >3 FB  Dental      Pulmonary - negative pulmonary ROS and normal exam   Cardiovascular - negative cardio ROS and normal exam  Exercise tolerance: excellent (>7 METS)        Neuro/Psych  GI/Hepatic/Renal/Endo - negative ROS     Musculoskeletal     Abdominal    Substance History      OB/GYN          Other                        Anesthesia Plan    ASA 2     general and MAC     intravenous induction     Anesthetic plan, risks, benefits, and alternatives have been provided, discussed and informed consent has been obtained with: patient.        CODE STATUS:

## 2022-06-10 NOTE — ANESTHESIA POSTPROCEDURE EVALUATION
Patient: Ion Matthew    Procedure Summary     Date: 06/10/22 Room / Location: [facility] ENDOSCOPY 4 / [facility] ENDOSCOPY    Anesthesia Start: 1056 Anesthesia Stop: 1150    Procedures:       ESOPHAGOGASTRODUODENOSCOPY WITH BIOPSIES (N/A )      COLONOSCOPY (N/A ) Diagnosis:       Personal history of colonic polyps      Abdominal bloating      Early satiety      Encounter for screening for malignant neoplasm of colon      (Personal history of colonic polyps [Z86.010])      (Abdominal bloating [R14.0])      (Early satiety [R68.81])      (Encounter for screening for malignant neoplasm of colon [Z12.11])    Surgeons: Duran Lawler MD Provider: Ty Penn MD    Anesthesia Type: general, MAC ASA Status: 2          Anesthesia Type: general, MAC    Vitals  Vitals Value Taken Time   /58 06/10/22 1155   Temp 35.9 °C (96.7 °F) 06/10/22 1150   Pulse 74 06/10/22 1200   Resp 20 06/10/22 1150   SpO2 93 % 06/10/22 1200   Vitals shown include unvalidated device data.        Post Anesthesia Care and Evaluation    Patient location during evaluation: bedside  Patient participation: complete - patient participated  Level of consciousness: awake  Pain management: adequate    Airway patency: patent  Anesthetic complications: No anesthetic complications  PONV Status: none  Cardiovascular status: acceptable  Respiratory status: acceptable  Hydration status: acceptable    Comments: An Anesthesiologist personally participated in the most demanding procedures (including induction and emergence if applicable) in the anesthesia plan, monitored the course of anesthesia administration at frequent intervals and remained physically present and available for immediate diagnosis and treatment of emergencies.

## 2022-06-10 NOTE — H&P
"Pre Procedure History & Physical    Chief Complaint:   Generalized abdominal pain and history:    Subjective     HPI:   Generalized abdominal pain and past history colon polyp    Past Medical History:   Past Medical History:   Diagnosis Date   • Bladder leak    • Cancer (HCC) 2006    Prostate   • History of prostate cancer    • Pinched nerve in neck    • Wrist pain        Past Surgical History:  Past Surgical History:   Procedure Laterality Date   • APPENDECTOMY     • COLONOSCOPY     • HERNIA REPAIR     • HERNIA REPAIR     • PROSTATECTOMY     • URETHRAL SLING N/A 06/28/2017    Procedure: MALE ADVANCE SLING, CYSTOSCOPY;  Surgeon: Curt Hickman MD;  Location: [facility];  Service:        Family History:  Family History   Problem Relation Age of Onset   • Stroke Mother    • Diabetes Mother    • Diabetes Father    • Stroke Sister    • Heart failure Maternal Aunt    • Hypertension Other    • Malig Hyperthermia Neg Hx        Social History:   reports that he has never smoked. He has never used smokeless tobacco. He reports current alcohol use. He reports that he does not use drugs.    Medications:   Medications Prior to Admission   Medication Sig Dispense Refill Last Dose   • aspirin 81 MG EC tablet Take 81 mg by mouth Daily.   6/9/2022 at 0800   • atenolol-chlorthalidone (TENORETIC) 50-25 MG per tablet Take 1 tablet by mouth Daily.   6/10/2022 at Unknown time   • Testosterone 20.25 MG/ACT (1.62%) gel APPLY 3 PUMPS TO THE AFFECTED AREA EVERY DAY          Allergies:  Penicillins        Objective     Blood pressure 157/97, pulse 55, temperature 96.8 °F (36 °C), temperature source Temporal, resp. rate 16, height 182.9 cm (72.01\"), weight 93.6 kg (206 lb 5.6 oz), SpO2 97 %.    Physical Exam   Constitutional: Pt is oriented to person, place, and time and well-developed, well-nourished, and in no distress.   Mouth/Throat: Oropharynx is clear and moist.   Neck: Normal range of motion.   Cardiovascular: Normal rate, " regular rhythm and normal heart sounds.    Pulmonary/Chest: Effort normal and breath sounds normal.   Abdominal: Soft. Nontender  Skin: Skin is warm and dry.   Psychiatric: Mood, memory, affect and judgment normal.     Assessment & Plan     Diagnosis:  Generalized abdominal pain and past history colon polyp    Anticipated Surgical Procedure:  EGD colonoscopy    The risks, benefits, and alternatives of this procedure have been discussed with the patient or the responsible party- the patient understands and agrees to proceed.

## 2022-06-13 LAB
CYTO UR: NORMAL
LAB AP CASE REPORT: NORMAL
LAB AP CLINICAL INFORMATION: NORMAL
LAB AP SPECIAL STAINS: NORMAL
PATH REPORT.FINAL DX SPEC: NORMAL
PATH REPORT.GROSS SPEC: NORMAL

## 2022-06-14 ENCOUNTER — HOSPITAL ENCOUNTER (OUTPATIENT)
Dept: GENERAL RADIOLOGY | Facility: HOSPITAL | Age: 72
Discharge: HOME | End: 2022-06-14

## 2022-06-14 ENCOUNTER — TRANSCRIBE ORDERS (OUTPATIENT)
Dept: GENERAL RADIOLOGY | Facility: HOSPITAL | Age: 72
End: 2022-06-14

## 2022-06-14 DIAGNOSIS — M54.2 NECK PAIN: Primary | ICD-10-CM

## 2022-06-14 DIAGNOSIS — M54.2 NECK PAIN: ICD-10-CM

## 2022-06-14 PROCEDURE — 72220 X-RAY EXAM SACRUM TAILBONE: CPT

## 2022-06-14 PROCEDURE — 72050 X-RAY EXAM NECK SPINE 4/5VWS: CPT

## 2022-06-14 PROCEDURE — 72110 X-RAY EXAM L-2 SPINE 4/>VWS: CPT

## 2022-06-15 ENCOUNTER — TELEPHONE (OUTPATIENT)
Dept: GASTROENTEROLOGY | Facility: CLINIC | Age: 72
End: 2022-06-15

## 2022-06-15 DIAGNOSIS — B96.81 HELICOBACTER PYLORI GASTRITIS: Primary | ICD-10-CM

## 2022-06-15 DIAGNOSIS — K31.7 GASTRIC POLYPS: ICD-10-CM

## 2022-06-15 DIAGNOSIS — D13.1 BENIGN FUNDIC GLAND POLYPS OF STOMACH: ICD-10-CM

## 2022-06-15 DIAGNOSIS — K29.70 HELICOBACTER PYLORI GASTRITIS: Primary | ICD-10-CM

## 2022-06-15 RX ORDER — BISMUTH SUBCITRATE POTASSIUM, METRONIDAZOLE, TETRACYCLINE HYDROCHLORIDE 140; 125; 125 MG/1; MG/1; MG/1
3 CAPSULE ORAL
Qty: 168 CAPSULE | Refills: 0 | Status: SHIPPED | OUTPATIENT
Start: 2022-06-15 | End: 2022-06-29

## 2022-06-15 NOTE — TELEPHONE ENCOUNTER
Spoke to pt and informed of Laura NICHOLS result note, plan of care and recommendations. Educated and mailed pt on H. Pylori breath test instructions. Educated on referral to Dr. Wells regarding EUS. Pt verified understanding.

## 2022-06-15 NOTE — TELEPHONE ENCOUNTER
----- Message from SIMONE Dozier sent at 6/15/2022  1:24 PM EDT -----  Recall colonoscopy 5 years, it was normal.  Stomach polyps are benign, Dr. Lawler would like patient to have EUS due to the multiplicity of polyps.  Stomach biopsy does show H. pylori, I will send in Pylera regimen x14 days + pt needs PPI

## 2022-08-29 ENCOUNTER — TELEPHONE (OUTPATIENT)
Dept: GASTROENTEROLOGY | Facility: CLINIC | Age: 72
End: 2022-08-29

## 2022-08-29 DIAGNOSIS — B96.81 HELICOBACTER PYLORI GASTRITIS: Primary | ICD-10-CM

## 2022-08-29 DIAGNOSIS — K29.70 HELICOBACTER PYLORI GASTRITIS: Primary | ICD-10-CM

## 2022-11-11 ENCOUNTER — LAB (OUTPATIENT)
Dept: LAB | Facility: HOSPITAL | Age: 72
End: 2022-11-11

## 2022-11-11 DIAGNOSIS — B96.81 HELICOBACTER PYLORI GASTRITIS: ICD-10-CM

## 2022-11-11 DIAGNOSIS — K29.70 HELICOBACTER PYLORI GASTRITIS: ICD-10-CM

## 2022-11-11 LAB — UREA BREATH TEST QL: NEGATIVE

## 2022-11-11 PROCEDURE — 83013 H PYLORI (C-13) BREATH: CPT

## 2022-11-15 ENCOUNTER — TELEPHONE (OUTPATIENT)
Dept: GASTROENTEROLOGY | Facility: CLINIC | Age: 72
End: 2022-11-15

## 2022-11-15 NOTE — TELEPHONE ENCOUNTER
----- Message from SIMONE Dozier sent at 11/14/2022 12:55 PM EST -----  Please notify patient breath test negative, follow-up in office if having any GI complaints otherwise may follow-up as needed  Dr. Wells did EUS on patient in July, he recommended a repeat EGD in 3 to 6 months, can you please confirm if patient is set up with Dr. Wells for this?

## 2024-02-29 ENCOUNTER — HOSPITAL ENCOUNTER (OUTPATIENT)
Dept: GENERAL RADIOLOGY | Facility: HOSPITAL | Age: 74
Discharge: HOME | End: 2024-02-29
Admitting: INTERNAL MEDICINE
Payer: MEDICARE

## 2024-02-29 ENCOUNTER — TRANSCRIBE ORDERS (OUTPATIENT)
Dept: GENERAL RADIOLOGY | Facility: HOSPITAL | Age: 74
End: 2024-02-29
Payer: MEDICARE

## 2024-02-29 DIAGNOSIS — R05.1 ACUTE COUGH: ICD-10-CM

## 2024-02-29 DIAGNOSIS — R05.1 ACUTE COUGH: Primary | ICD-10-CM

## 2024-02-29 PROCEDURE — 71046 X-RAY EXAM CHEST 2 VIEWS: CPT

## 2024-04-12 ENCOUNTER — OFFICE VISIT (OUTPATIENT)
Dept: ORTHOPEDIC SURGERY | Facility: CLINIC | Age: 74
End: 2024-04-12
Payer: MEDICARE

## 2024-04-12 VITALS
BODY MASS INDEX: 26.95 KG/M2 | OXYGEN SATURATION: 95 % | WEIGHT: 199 LBS | HEART RATE: 81 BPM | HEIGHT: 72 IN | DIASTOLIC BLOOD PRESSURE: 89 MMHG | SYSTOLIC BLOOD PRESSURE: 152 MMHG

## 2024-04-12 DIAGNOSIS — M75.42 IMPINGEMENT SYNDROME OF LEFT SHOULDER: ICD-10-CM

## 2024-04-12 DIAGNOSIS — M25.512 LEFT SHOULDER PAIN, UNSPECIFIED CHRONICITY: Primary | ICD-10-CM

## 2024-04-12 NOTE — PROGRESS NOTES
"Chief Complaint  Initial Evaluation of the Left Shoulder     Subjective      Ion Matthew presents to [facility] for an evaluation for his left shoulder. He has pain in the shoulder for years.  He has done martial arts for awhile.  There are times when it has popped out of place.  He does weight lifting and has had no recent injury or fall. His pain is in the shoulder.  He has pain with over head movement.       Allergies   Allergen Reactions    Penicillins Other (See Comments)     UNKNOWN        Social History     Socioeconomic History    Marital status:    Tobacco Use    Smoking status: Never    Smokeless tobacco: Never   Vaping Use    Vaping status: Never Used   Substance and Sexual Activity    Alcohol use: Yes     Comment: socially    Drug use: Never    Sexual activity: Not Currently        I reviewed the patient's chief complaint, history of present illness, review of systems, past medical history, surgical history, family history, social history, medications, and allergy list.     Review of Systems     Constitutional: Denies fevers, chills, weight loss  Cardiovascular: Denies chest pain, shortness of breath  Skin: Denies rashes, acute skin changes  Neurologic: Denies headache, loss of consciousness  MSK: left shoulder pain       Vital Signs:   /89   Pulse 81   Ht 182.9 cm (72\")   Wt 90.3 kg (199 lb)   SpO2 95%   BMI 26.99 kg/m²          Physical Exam  General: Alert. No acute distress    Ortho Exam      Left upper extremity: Forward flexion 170 with pain. Abduction 120. External rotation 60. Internal rotation L5. Negative Cross body adduction. Supraspinatus strength 5/5. Infraspinatus Strength 5/5. Infrared subscap 5/5. Positive Stoddard. Positive Neer. Negative Apprehension. Negative Lift off. (Negative Obriens. Sensation intact to light touch, median, radial, ulnar nerve. Positive AIN, PIN, ulnar nerve motor. Positive pulses. Positive Impingement signs. Good " strength in triceps, biceps, deltoid, wrist extensors and wrist flexors. Tender to palpation to the anterior aspect of the shoulder.         Procedures        Imaging Results (Most Recent)       Procedure Component Value Units Date/Time    XR Scapula Left [[id number]] Resulted: 04/12/24 1358     Updated: 04/12/24 1401             Result Review :     X-Ray Report:  Left scapula X-Ray  Indication: Evaluation of left shoulder pain   AP/Lateral view(s)  Findings: Moderate acromioclavicular arthritis.  No fracture or dislocation.    Prior studies available for comparison: no       No results found.           Assessment and Plan     Diagnoses and all orders for this visit:    1. Left shoulder pain, unspecified chronicity (Primary)  -     XR Scapula Left    2. Impingement syndrome of left shoulder        The patient presents here today for an evaluation of his left shoulder. X-rays were obtained today in the office and these were reviewed with the patient today.     MRI of the left shoulder to assess the shoulder joint.          Call or return if worsening symptoms.    Follow Up     After MRI of the left shoulder.       Patient was given instructions and counseling regarding his condition or for health maintenance advice. Please see specific information pulled into the AVS if appropriate.     Scribed for Micha Santana MD by Shani Nichols.  04/12/24   14:05 EDT    I have personally performed the services described in this document as scribed by the above individual and it is both accurate and complete. Micha Santana MD 04/12/24

## 2024-05-08 ENCOUNTER — TELEPHONE (OUTPATIENT)
Dept: ORTHOPEDIC SURGERY | Facility: CLINIC | Age: 74
End: 2024-05-08
Payer: MEDICARE

## 2024-05-24 ENCOUNTER — HOSPITAL ENCOUNTER (OUTPATIENT)
Dept: MRI IMAGING | Facility: HOSPITAL | Age: 74
Discharge: HOME | End: 2024-05-24
Payer: MEDICARE

## 2024-05-24 DIAGNOSIS — M25.512 LEFT SHOULDER PAIN, UNSPECIFIED CHRONICITY: ICD-10-CM

## 2024-05-24 DIAGNOSIS — M75.42 IMPINGEMENT SYNDROME OF LEFT SHOULDER: ICD-10-CM

## 2024-06-05 ENCOUNTER — TELEPHONE (OUTPATIENT)
Dept: ORTHOPEDIC SURGERY | Facility: CLINIC | Age: 74
End: 2024-06-05
Payer: MEDICARE

## 2024-06-05 NOTE — TELEPHONE ENCOUNTER
Provider:     Caller: ORTIZ MCMANUS    Relationship to Patient: SELF    Phone Number: [phone]    Reason for Call:PATIENT CALLED STATING HE WENT TO DO AN MRI 05/24/24 WITH [facility] BUT WAS EXPERIENCING CLAUSTROPHOBIA. WANTS TO KNOW IF HE CAN BE SENT SOMEWHERE FOR AN OPEN MRI FOR THE LEFT SHOULDER. PLEASE ADVISE.

## 2024-06-05 NOTE — TELEPHONE ENCOUNTER
SPOKE WITH PATIENT AND [facility] DO[facility] NOT RECEIVE A CALL TO SCHEDULE - FAXED NOTES AND REFERRAL - PATIENT VOICED UNDERSTANDING

## 2024-11-08 ENCOUNTER — OFFICE VISIT (OUTPATIENT)
Dept: ORTHOPEDIC SURGERY | Facility: CLINIC | Age: 74
End: 2024-11-08
Payer: MEDICARE

## 2024-11-08 VITALS
DIASTOLIC BLOOD PRESSURE: 80 MMHG | WEIGHT: 199 LBS | HEIGHT: 72 IN | BODY MASS INDEX: 26.95 KG/M2 | SYSTOLIC BLOOD PRESSURE: 156 MMHG | OXYGEN SATURATION: 96 % | HEART RATE: 70 BPM

## 2024-11-08 DIAGNOSIS — M25.512 LEFT SHOULDER PAIN, UNSPECIFIED CHRONICITY: Primary | ICD-10-CM

## 2024-11-08 DIAGNOSIS — M75.42 IMPINGEMENT SYNDROME OF LEFT SHOULDER: ICD-10-CM

## 2024-11-08 DIAGNOSIS — M75.82 ROTATOR CUFF TENDINITIS, LEFT: ICD-10-CM

## 2024-11-08 RX ADMIN — TRIAMCINOLONE ACETONIDE 40 MG: 40 INJECTION, SUSPENSION INTRA-ARTICULAR; INTRAMUSCULAR at 14:36

## 2024-11-08 RX ADMIN — LIDOCAINE HYDROCHLORIDE 5 ML: 10 INJECTION, SOLUTION INFILTRATION; PERINEURAL at 14:36

## 2024-11-08 NOTE — PROGRESS NOTES
"Chief Complaint  Follow-up of the Left Shoulder    Subjective      Ion Matthew presents to [facility] for follow up of his left shoulder.  Patient was last seen in office on 4/12/24 for his left shoulder pain and MRI was ordered.  Patient obtained an open MRI and is here today to review.  Patient states that overall his left shoulder is okay most of the time.  He does report he has certain movements that cause the pain to be worse.  He states currently his shoulder pain is \"atrocious\".  He denies any specific known injuriesOr falls since the MRI/since his office visit.  Allergies   Allergen Reactions    Penicillins Other (See Comments)     UNKNOWN       Objective     Vital Signs:   Vitals:    11/08/24 1357   BP: 156/80   Pulse: 70   SpO2: 96%   Weight: 90.3 kg (199 lb)   Height: 182.9 cm (72.01\")     Body mass index is 26.98 kg/m².    I reviewed the patient's chief complaint, history of present illness, review of systems, past medical history, surgical history, family history, social history, medications, and allergy list.     REVIEW OF SYSTEMS    Constitutional: Denies fevers, chills, weight loss  Cardiovascular: Denies chest pain, shortness of breath  Skin: Denies rashes, acute skin changes  Neurologic: Denies headache, loss of consciousness  MSK: Left shoulder pain.     Ortho Exam  Shoulder   General: Alert. No acute distress.  Left upper Extremity: 180 degrees active elevation. External rotation to 45 degrees. Internal rotation to mid thoracic.   Demonstrates intact active elbow ROM. Demonstrates intact active wrist ROM. Sensation intact. Palpable radial pulse. Neurovascularly intact.      Large Joint  Date/Time: 11/8/2024 2:36 PM  Consent given by: patient  Site marked: site marked  Timeout: Immediately prior to procedure a time out was called to verify the correct patient, procedure, equipment, support staff and site/side marked as required   Procedure Details  Location: " shoulder (left) -   Preparation: Patient was prepped and draped in the usual sterile fashion  Needle gauge: 21g.  Medications administered: 5 mL lidocaine 1 %; 40 mg triamcinolone acetonide 40 MG/ML  Patient tolerance: patient tolerated the procedure well with no immediate complications       This injection documentation was Scribed for SIMONE Rosario by Onelia Kiser MA.  11/08/24   14:37 EST    MRI left shoulder:  Performed on 7/8/2024  Conclusion:  1.  Deep bursal sided tearing of the supraspinatus tendon at the critical zone with high-grade interstitial extension at the footprint.  Finding is likely result of moderate rotator cuff outlet impingement with a prominent subacromial spur.  2.  Moderate rotator cuff tendon note bursitis otherwise without full-thickness or retracted rotator cuff tear.  3.  Moderate AC joint osteoarthritis.  Mild glenohumeral joint arthritis.         Assessment and Plan   Diagnoses and all orders for this visit:    1. Left shoulder pain, unspecified chronicity (Primary)    2. Impingement syndrome of left shoulder    3. Rotator cuff tendinitis, left    Other orders  -     Large Joint         Ion Matthew presents to [facility] for his left shoulder.  MRI was reviewed.     We discussed further conservative management for their left shoulder. This includes but is not limited to - oral NSAIDs, topical NSAIDs, PT/home exercise program, intermittent steroid injections.  Patient elected to continue conservative management. Home exercises were provided to patient today.     We discussed the risks, benefits and alternatives of injections. Patient was informed of possible adverse effects including but not limited to bleeding, damage to nerve, tendon or artery, increased blood sugar and increased blood pressure. Discussed possibility of a reaction from the injection.  Discussed the possibility that the injection may not completely improve or remove the  pain.  Discussed the risk of infection.  Discussed the possibility of worsening pain after the injection.  Informed consent obtained.  Time out was performed.  Chlorhexidine was swabbed at injection site per typical technique. Needle injected into bursa, aspiration was performed and then left shoulder steroid slowly injected into joint space, fluid was free flowing. Needle was removed, band-aid placed to injection site.  Patient tolerated injection well with no complications.     We discussed that he is eligible for repeat left shoulder steroid injection in 3 months.  We also discussed possibility of physical therapy however patient states that he is comfortable with doing his home exercises on his own to maintain his range of motion and strength.        Follow Up   Return in about 3 months (around 2/8/2025).  There are no Patient Instructions on file for this visit.  Patient was given instructions and counseling regarding his condition or for health maintenance advice. Please see specific information pulled into the AVS if appropriate.       Dictated Utilizing Dragon Dictation. Please note that portions of this note were completed with a voice recognition program. Part of this note may be an electronic transcription/translation of spoken language to printed text using the Dragon Dictation System.

## 2024-11-10 RX ORDER — LIDOCAINE HYDROCHLORIDE 10 MG/ML
5 INJECTION, SOLUTION INFILTRATION; PERINEURAL
Status: COMPLETED | OUTPATIENT
Start: 2024-11-08 | End: 2024-11-08

## 2024-11-10 RX ORDER — TRIAMCINOLONE ACETONIDE 40 MG/ML
40 INJECTION, SUSPENSION INTRA-ARTICULAR; INTRAMUSCULAR
Status: COMPLETED | OUTPATIENT
Start: 2024-11-08 | End: 2024-11-08

## 2025-02-20 ENCOUNTER — TELEPHONE (OUTPATIENT)
Dept: ORTHOPEDIC SURGERY | Facility: CLINIC | Age: 75
End: 2025-02-20
Payer: MEDICARE

## 2025-02-20 NOTE — TELEPHONE ENCOUNTER
DR BEEN OUT OF OFFICE FROM 1-1:30. LVM FOR PT TO CALL BACK AND R/S. OK FOR HUB TO SCHEDULE NEXT AVAILABLE.

## 2025-03-07 ENCOUNTER — OFFICE VISIT (OUTPATIENT)
Dept: ORTHOPEDIC SURGERY | Facility: CLINIC | Age: 75
End: 2025-03-07
Payer: MEDICARE

## 2025-03-07 VITALS — WEIGHT: 199 LBS | HEIGHT: 72 IN | BODY MASS INDEX: 26.95 KG/M2

## 2025-03-07 DIAGNOSIS — M75.42 IMPINGEMENT SYNDROME OF LEFT SHOULDER: Primary | ICD-10-CM

## 2025-03-07 DIAGNOSIS — M75.82 ROTATOR CUFF TENDINITIS, LEFT: ICD-10-CM

## 2025-03-07 RX ORDER — LIDOCAINE HYDROCHLORIDE 10 MG/ML
5 INJECTION, SOLUTION INFILTRATION; PERINEURAL
Status: COMPLETED | OUTPATIENT
Start: 2025-03-07 | End: 2025-03-07

## 2025-03-07 RX ORDER — TRIAMCINOLONE ACETONIDE 40 MG/ML
40 INJECTION, SUSPENSION INTRA-ARTICULAR; INTRAMUSCULAR
Status: COMPLETED | OUTPATIENT
Start: 2025-03-07 | End: 2025-03-07

## 2025-03-07 RX ADMIN — LIDOCAINE HYDROCHLORIDE 5 ML: 10 INJECTION, SOLUTION INFILTRATION; PERINEURAL at 14:42

## 2025-03-07 RX ADMIN — TRIAMCINOLONE ACETONIDE 40 MG: 40 INJECTION, SUSPENSION INTRA-ARTICULAR; INTRAMUSCULAR at 14:42

## 2025-03-07 NOTE — PROGRESS NOTES
"Chief Complaint  Follow-up of the Left Shoulder       Subjective      Ion Matthew presents to [facility] for a follow up for his left shoulder. He has been treating her left shoulder impingement and rotator cuff tendonitis conservatively. He was last seen in the office on 11/08/24 where he had a left shoulder steroid injection. He states this injection gave him great relief but only lasted two months. He denies any new injury or falls since his last visit.     Allergies   Allergen Reactions    Penicillins Other (See Comments)     UNKNOWN        Social History     Socioeconomic History    Marital status:    Tobacco Use    Smoking status: Never    Smokeless tobacco: Never   Vaping Use    Vaping status: Never Used   Substance and Sexual Activity    Alcohol use: Yes     Alcohol/week: 1.0 standard drink of alcohol     Types: 1 Glasses of wine per week     Comment: socially    Drug use: Never    Sexual activity: Not Currently        I reviewed the patient's chief complaint, history of present illness, review of systems, past medical history, surgical history, family history, social history, medications, and allergy list.     Review of Systems     Constitutional: Denies fevers, chills, weight loss  Cardiovascular: Denies chest pain, shortness of breath  Skin: Denies rashes, acute skin changes  Neurologic: Denies headache, loss of consciousness  MSK: left shoulder pain       Vital Signs:   Ht 182.9 cm (72\")   Wt 90.3 kg (199 lb)   BMI 26.99 kg/m²          Physical Exam  General: Alert. No acute distress    Ortho Exam        Left upper extremity: 180 degrees active elevation. External rotation to 45 degrees. Internal rotation to mid thoracic.   Demonstrates intact active elbow ROM. Demonstrates intact active wrist ROM. Sensation intact. Palpable radial pulse. Neurovascularly intact.        Large Joint Arthrocentesis: L subacromial bursa  Date/Time: 3/7/2025 2:42 PM  Consent given " by: patient  Site marked: site marked  Timeout: Immediately prior to procedure a time out was called to verify the correct patient, procedure, equipment, support staff and site/side marked as required   Supporting Documentation  Indications: pain   Procedure Details  Location: shoulder - L subacromial bursa  Needle gauge: 21 G.  Medications administered: 5 mL lidocaine 1 %; 40 mg triamcinolone acetonide 40 MG/ML  Patient tolerance: patient tolerated the procedure well with no immediate complications      This injection documentation was Scribed for Micha Santana MD by Alie Pearce.  03/07/25   14:42 EST      Imaging Results (Most Recent)       None             Result Review :       No results found.           Assessment and Plan     Diagnoses and all orders for this visit:    1. Impingement syndrome of left shoulder (Primary)    2. Rotator cuff tendinitis, left        The patient presents here today for a follow up for his left shoulder.     Discussed the treatment plan with the patient, operative vs non-operative.     Patient wishes to proceed with conservative treatment options at this time.     Discussed the risks and benefits of a left shoulder steroid injection today in the office. Patient expressed understanding and wishes to proceed. Patient tolerted the injection well and without any complications.     He will continue home exercises and continue over the counter medications.     Call or return if worsening symptoms.    Follow Up     PRN     Patient was given instructions and counseling regarding his condition or for health maintenance advice. Please see specific information pulled into the AVS if appropriate.     Scribed for Micha Santana MD by Shani Nichols.  03/07/25   14:31 EST    I have personally performed the services described in this document as scribed by the above individual and it is both accurate and complete. Micha Santana MD 03/07/25

## 2025-08-08 ENCOUNTER — HOSPITAL ENCOUNTER (OUTPATIENT)
Dept: GENERAL RADIOLOGY | Facility: HOSPITAL | Age: 75
Discharge: HOME | End: 2025-08-08
Payer: MEDICARE

## 2025-08-08 ENCOUNTER — TRANSCRIBE ORDERS (OUTPATIENT)
Dept: ADMINISTRATIVE | Facility: HOSPITAL | Age: 75
End: 2025-08-08
Payer: MEDICARE

## 2025-08-08 DIAGNOSIS — M54.50 LOW BACK PAIN, UNSPECIFIED BACK PAIN LATERALITY, UNSPECIFIED CHRONICITY, UNSPECIFIED WHETHER SCIATICA PRESENT: ICD-10-CM

## 2025-08-08 DIAGNOSIS — M54.50 LOW BACK PAIN, UNSPECIFIED BACK PAIN LATERALITY, UNSPECIFIED CHRONICITY, UNSPECIFIED WHETHER SCIATICA PRESENT: Primary | ICD-10-CM

## 2025-08-08 PROCEDURE — 72110 X-RAY EXAM L-2 SPINE 4/>VWS: CPT

## 2025-08-13 ENCOUNTER — OFFICE VISIT (OUTPATIENT)
Dept: OTOLARYNGOLOGY | Facility: CLINIC | Age: 75
End: 2025-08-13
Payer: MEDICARE

## 2025-08-13 VITALS
OXYGEN SATURATION: 94 % | DIASTOLIC BLOOD PRESSURE: 88 MMHG | BODY MASS INDEX: 28.44 KG/M2 | HEART RATE: 65 BPM | HEIGHT: 72 IN | WEIGHT: 210 LBS | TEMPERATURE: 97.4 F | SYSTOLIC BLOOD PRESSURE: 145 MMHG

## 2025-08-13 DIAGNOSIS — H61.23 BILATERAL IMPACTED CERUMEN: ICD-10-CM

## 2025-08-13 DIAGNOSIS — D17.0 LIPOMA OF FACE: Primary | ICD-10-CM

## 2025-08-13 RX ORDER — DICLOFENAC SODIUM 75 MG/1
1 TABLET, DELAYED RELEASE ORAL EVERY 12 HOURS SCHEDULED
COMMUNITY
Start: 2025-07-30

## 2025-08-13 RX ORDER — TRIAMCINOLONE ACETONIDE 1 MG/G
CREAM TOPICAL
COMMUNITY
Start: 2025-06-20

## 2025-08-13 RX ORDER — LOSARTAN POTASSIUM 50 MG/1
1 TABLET ORAL DAILY
COMMUNITY
Start: 2025-05-19

## 2025-08-13 RX ORDER — AMLODIPINE BESYLATE 5 MG/1
1 TABLET ORAL DAILY
COMMUNITY
Start: 2025-07-08

## 2025-08-13 RX ORDER — HYDROCHLOROTHIAZIDE 12.5 MG/1
1 CAPSULE ORAL DAILY
COMMUNITY
Start: 2025-07-24

## 2025-08-13 RX ORDER — ATENOLOL 50 MG/1
1 TABLET ORAL DAILY
COMMUNITY
Start: 2025-06-20

## 2025-08-13 RX ORDER — ANASTROZOLE 1 MG/1
TABLET ORAL
COMMUNITY
Start: 2025-05-31

## 2025-08-14 ENCOUNTER — PATIENT ROUNDING (BHMG ONLY) (OUTPATIENT)
Dept: OTOLARYNGOLOGY | Facility: CLINIC | Age: 75
End: 2025-08-14
Payer: MEDICARE

## (undated) DEVICE — ST IRR CYSTO W/SPK 77IN LF

## (undated) DEVICE — SUT MNCRYL PLS ANTIB UD 4/0 PS2 18IN

## (undated) DEVICE — LOU MINOR PROCEDURE: Brand: MEDLINE INDUSTRIES, INC.

## (undated) DEVICE — ADHS SKIN DERMABOND

## (undated) DEVICE — JELLY,LUBE,STERILE,FLIP TOP,TUBE,4-OZ: Brand: MEDLINE

## (undated) DEVICE — INTENDED FOR TISSUE SEPARATION, AND OTHER PROCEDURES THAT REQUIRE A SHARP SURGICAL BLADE TO PUNCTURE OR CUT.: Brand: BARD-PARKER ® CARBON RIB-BACK BLADES

## (undated) DEVICE — VIOLET BRAIDED (POLYGLACTIN 910), SYNTHETIC ABSORBABLE SUTURE: Brand: COATED VICRYL

## (undated) DEVICE — Device: Brand: DEFENDO AIR/WATER/SUCTION AND BIOPSY VALVE

## (undated) DEVICE — SOL IRRG H2O PL/BG 1000ML STRL

## (undated) DEVICE — EGD OR ERCP KIT: Brand: MEDLINE INDUSTRIES, INC.

## (undated) DEVICE — SOL H2O INJ PL/BG 1000ML STRL

## (undated) DEVICE — Device: Brand: FABCO

## (undated) DEVICE — DRP BLDR SUSP LINGEMAN

## (undated) DEVICE — GLV SURG BIOGEL LTX PF 7

## (undated) DEVICE — COLON KIT: Brand: MEDLINE INDUSTRIES, INC.

## (undated) DEVICE — IRRIGATOR BULB ASEPTO 60CC STRL

## (undated) DEVICE — URINE LEG BAG WITH EXTENSION TUBE: Brand: DOVER

## (undated) DEVICE — ADHS SKIN DERMABOND TOP ADVANCED

## (undated) DEVICE — SINGLE-USE BIOPSY FORCEPS: Brand: RADIAL JAW 4

## (undated) DEVICE — CATH FOL SIMPLYLATEX SILELAST 14F 17IN

## (undated) DEVICE — APPL CHLORAPREP W/TINT 26ML ORNG